# Patient Record
Sex: FEMALE | Race: WHITE
[De-identification: names, ages, dates, MRNs, and addresses within clinical notes are randomized per-mention and may not be internally consistent; named-entity substitution may affect disease eponyms.]

---

## 2018-04-17 NOTE — HISTORY & PHYSICAL PRE-OP
General Information and HPI
History of Present Illness:
This patient is a 52-year-old  1 para 0 who was admitted today with a 
diagnosis of chronic menorrhagia and treatment today for NovaSure ablation and D
&C hysteroscopy.
 
Allergies/Medications
Allergies:
Coded Allergies:
cat dander (Intermediate, HIVES 17)
shellfish derived (Intermediate, RED BLOTCHY 17)
iodine (RED AND ITCHY 18)
oak (PER ALLERGIST 17)
Uncoded Allergies:
?ANTIBIOTIC (UNKNOWN 11/02/15)
MAPLE (HIVES 11/02/15)
 
Home Med list
Ascorbate Calcium (Vitamin C) 500 MG TABLET   1 TAB PO EOD SUPPLEMENT  (Reported
)
Cetirizine HCl (Zyrtec) 10 MG TABLET   1 TAB PO DAILY ALLERGIES  (Reported)
Dicyclomine HCl 10 MG CAPSULE   1 CAP PO PRN GI  (Reported)
Esomeprazole Magnesium (Nexium) 20 MG CAPSULE.DR   1 CAP PO DAILY GI  (Reported)
Ferrous Sulfate (Slow Fe) 142 MG (45 MG IRON) TABLET.ER   1 TAB PO EOD 
SUPPLEMENT  (Reported)
Gabapentin 600 MG TABLET   1 TAB PO QPM RLS  (Reported)
Meloxicam 15 MG TABLET   1 TAB PO DAILY PAIN/INFLAMMATION  (Reported)
 
 
Past History
 
Medical History
Neurological: NONE
EENT: NONE
Cardiovascular: NONE
Respiratory: NONE
Gastrointestinal: NONE
Hepatic: NONE
Renal: NONE
Musculoskeletal: NONE
Psychiatric: depression
Endocrine: NONE
Blood Disorders: NONE
Cancer(s): NONE
GYN/Reproductive: NONE
 
Surgical History
Pertinent Surgical History: non-contributory
 
Past Family/Social History
 
Family History
Relations & Conditions if any
MOTHER
  FH: back pain
FATHER
 
 
Review of Systems
 
Review of Systems
Constitutional:
Reports: no symptoms. 
EENTM:
Reports: no symptoms. 
Cardiovascular:
Reports: no symptoms. 
Respiratory:
Reports: no symptoms. 
GI:
Reports: no symptoms. 
Genitourinary:
Reports: no symptoms. 
Musculoskeletal:
Reports: no symptoms. 
Skin:
Reports: no symptoms. 
Neurological/Psychological:
Reports: no symptoms. 
Hematologic/Endocrine:
Reports: no symptoms. 
Immunologic/Allergic:
Reports: no symptoms. 
All Other Systems: Reviewed and Negative
 
Exam & Diagnostic Data
Last 24 Hrs of Vital Signs/I&O
 Intake & Output
 
 
  1600  0800  0000
 
Intake Total   
 
Output Total   
 
Balance   
 
    
 
Patient 142 lb  
 
Weight   
 
 
 
Physical Exam:
HEENT: Atraumatic normocephalic
Chest: Clear to auscultation
Cardiovascular: Normal S1-S2
Abdomen: Soft nontender
Pelvic: Deferred to the OR
Extremities: No clubbing cyanosis or edema
 
Assessment/Plan
Assessment/Plan:
Menorrhagia
Plan: D&C hysteroscopy and NovaSure ablation
 
As Ranked By This Provider
Problem List:
 1. Menorrhagia

## 2018-04-18 ENCOUNTER — HOSPITAL ENCOUNTER (OUTPATIENT)
Dept: HOSPITAL 68 - STS | Age: 53
End: 2018-04-18
Attending: OBSTETRICS & GYNECOLOGY
Payer: COMMERCIAL

## 2018-04-18 VITALS — HEIGHT: 61 IN | BODY MASS INDEX: 26.81 KG/M2 | WEIGHT: 142 LBS

## 2018-04-18 DIAGNOSIS — N92.1: Primary | ICD-10-CM

## 2018-04-18 DIAGNOSIS — G25.81: ICD-10-CM

## 2018-04-18 DIAGNOSIS — N72: ICD-10-CM

## 2018-04-18 DIAGNOSIS — K21.9: ICD-10-CM

## 2018-04-19 NOTE — OPERATIVE REPORT
**See Addendum**
Operative/Inv Procedure Report
Surgery Date: 04/18/18
Name of Procedure:
D&C hysteroscopy attempted NovaSure ablation
Pre-Operative Diagnosis:
Menorrhagia
Post-Operative Diagnosis:
Same
Estimated Blood Loss: less than 50ml
Surgeon/Assistant:
Víctor Amezquita MD
 
Anesthesia: moderate sedation
 
Operative/Procedure Note
Note:
The patient was brought to the operating room placed on the OR table in the 
dorsal supine position.  She was given adequate anesthesia and repositioned in a
modified dorsal lithotomy.  She was prepped and draped in usual sterile fashion.
 A weighted speculum was inserted into the vagina and with the help of a Kya 
retractor single-tooth tenaculum was attached to the anterior lip of the cervix.
 The cervix was injected with 1% lidocaine with epinephrine 2 and have cc in 
each quadrant.  An endocervical curettage was performed revealing a small amount
of tissue.  The uterus was then sounded to 6 cm anteverted.  The cervix was 
serially dilated to accommodate the hysteroscope.  The hysteroscope was placed 
into the uterus and the saline infusion was activated.  No polyps or fibroids or
other polyp pathology were noted.  Both ostia were seen.  The hysteroscope was 
then removed and the NovaSure was placed into the uterus.  With several attempts
to open the NovaSure the width was never more than 2-1/2 cm and was always in 
the red zone and unable to start the procedure secondary to small uterine 
cavity.  The instrument was then removed the patient was awakened and sent to 
recovery in good condition.
 
All needle, sponge, and instrument counts were correct at the end of the 
procedure 2.